# Patient Record
Sex: FEMALE
[De-identification: names, ages, dates, MRNs, and addresses within clinical notes are randomized per-mention and may not be internally consistent; named-entity substitution may affect disease eponyms.]

---

## 2020-08-31 DIAGNOSIS — A49.9 URINARY TRACT INFECTION, SITE NOT SPECIFIED: ICD-10-CM

## 2020-08-31 DIAGNOSIS — N39.0 URINARY TRACT INFECTION, SITE NOT SPECIFIED: ICD-10-CM

## 2020-08-31 PROBLEM — Z00.00 ENCOUNTER FOR PREVENTIVE HEALTH EXAMINATION: Status: ACTIVE | Noted: 2020-08-31

## 2020-09-02 ENCOUNTER — APPOINTMENT (OUTPATIENT)
Dept: UROGYNECOLOGY | Facility: CLINIC | Age: 60
End: 2020-09-02
Payer: COMMERCIAL

## 2020-09-02 VITALS
BODY MASS INDEX: 21.12 KG/M2 | SYSTOLIC BLOOD PRESSURE: 120 MMHG | TEMPERATURE: 94.7 F | DIASTOLIC BLOOD PRESSURE: 80 MMHG | HEIGHT: 66.5 IN | WEIGHT: 133 LBS

## 2020-09-02 DIAGNOSIS — N95.2 POSTMENOPAUSAL ATROPHIC VAGINITIS: ICD-10-CM

## 2020-09-02 DIAGNOSIS — N39.8 OTHER SPECIFIED DISORDERS OF URINARY SYSTEM: ICD-10-CM

## 2020-09-02 PROCEDURE — 51798 US URINE CAPACITY MEASURE: CPT

## 2020-09-02 PROCEDURE — 99204 OFFICE O/P NEW MOD 45 MIN: CPT

## 2020-09-02 RX ORDER — ESTRADIOL 0.1 MG/G
0.1 CREAM VAGINAL
Qty: 1 | Refills: 3 | Status: ACTIVE | COMMUNITY
Start: 2020-09-02 | End: 1900-01-01

## 2020-09-02 RX ORDER — CONJUGATED ESTROGENS 0.62 MG/G
0.62 CREAM VAGINAL
Qty: 3 | Refills: 3 | Status: ACTIVE | COMMUNITY
Start: 2020-09-02 | End: 1900-01-01

## 2020-09-02 NOTE — LETTER BODY
[Dear  ___] : Dear  [unfilled], [I had the pleasure of evaluating your patient, [unfilled]. Thank you for referring Ms. [unfilled] for consultation for ___] : I had the pleasure of evaluating your patient, [unfilled]. Thank you for referring Ms. [unfilled] for consultation for [unfilled]. [Attached please find my note.] : Attached please find my note. [Thank you very much for allowing me to participate in the care of this patient. If you have any questions, please do not hesitate to contact me] : Thank you very much for allowing me to participate in the care of this patient. If you have any questions, please do not hesitate to contact me. [DrBrittanie  ___] : Dr. GIVENS

## 2020-09-02 NOTE — PHYSICAL EXAM
[No Acute Distress] : in no acute distress [Well developed] : well developed [Well Nourished] : ~L well nourished [Oriented x3] : oriented to person, place, and time [Good Hygeine] : demonstrates good hygeine [Normal Memory] : ~T memory was ~L unimpaired [Normal Mood/Affect] : mood and affect are normal [Normal Lung Sounds] : the lungs were clear to auscultation [Respirations regular] : ~T respiratory rate was regular [Rate & Rhythm Regular] : ~T heart rate and rhythm were normal [No Edema] : ~T edema was not present [Supple] : ~T the neck demonstrated no ~M decrease in suppleness [Thyroid Normal] : the thyroid ~T showed no abnormalities [Symmetrical] : the neck was ~L symmetrical [Normal Gait] : gait was normal [Labia Majora] : were normal [Labia Minora] : were normal [Bartholin's Gland] : both Bartholin's glands were normal  [Normal Appearance] : general appearance was normal [Atrophy] : atrophy [No Bleeding] : there was no active vaginal bleeding [Normal] : no abnormalities [Exam Deferred] : was deferred [1] : 1 [Post Void Residual ____ml] : post void residual was [unfilled] ml [Anxiety] : patient is not anxious [Dyspnea] : no dyspnea [Cough] : no cough [Murmurs] : no murmurs were heard [Varicose Veins] : no varicose veins observed [Tracheal Deviation] : no tracheal deviation observed [Mass (___ Cm)] : no ~M [unfilled] abdominal mass was palpated [Mass] : no ~M [unfilled] neck mass was observed [Tenderness] : ~T no ~M abdominal tenderness observed [Distended] : not distended [Hernia] : no hernia observed [H/Smegaly] : no hepatosplenomegaly [Scar] : no scars [Estrogen Effect] : no estrogen effect was observed [de-identified] : reproducible tenderness with a q tip [de-identified] : no sig prolapse

## 2020-09-02 NOTE — HISTORY OF PRESENT ILLNESS
[FreeTextEntry1] : The pt is a 61 y/o P1 with hx of  UTIs since 1983 annually then she began having constant urgency, freq since 7/2 although all her urine cxs have been neg.  \par Her discomfort improves after voiding.\par She was being treated by a urologist for yrs and underwent urethral dilation numerous times with some improvement.  She is not sure of having had cystoscopy.\par She also has tried Chinese herbal medications and tea with some improvement.  \par She denies UI\par She feels complete emptying of her bladder.\par She voids every hr with a medium volume and has nocturia 0-2 with a medium volume\par Her liquid intake consists of water, 1 cup of coffee/day.  She had a glass of white wine and is now feeling much worse.\par She has a BM q day .\par She denies gross hematuria, hx of nephrolithiaisis or vaginal bulge.\par Her last PAP was 2019 , and she denies a hx of abnormal PAP.\par The last time she had intercourse was 3 wks ago . She reports pain with touch\par She denies having had surgery.\par She is in retail, fashion\par

## 2020-09-02 NOTE — ASSESSMENT
[FreeTextEntry1] : Today's findings were discussed with the pt and written pamphlets were provided for IC and vaginal atrophy.   She will be scheduled for office cystoscopy.  Also, she will be scheduled for a repeat PVR and  her urine was sent for culture today to rule out an infectious cause of her symptoms.\par She was prescribed with estrogen cream for tx of vaginal atrophy\par \par \par

## 2020-09-04 LAB — BACTERIA UR CULT: NORMAL

## 2020-09-21 ENCOUNTER — APPOINTMENT (OUTPATIENT)
Dept: UROGYNECOLOGY | Facility: CLINIC | Age: 60
End: 2020-09-21
Payer: COMMERCIAL

## 2020-09-21 PROCEDURE — 51798 US URINE CAPACITY MEASURE: CPT

## 2020-09-21 PROCEDURE — 52000 CYSTOURETHROSCOPY: CPT

## 2020-09-23 LAB — URINE CYTOLOGY: NORMAL

## 2020-10-05 ENCOUNTER — APPOINTMENT (OUTPATIENT)
Dept: UROGYNECOLOGY | Facility: CLINIC | Age: 60
End: 2020-10-05
Payer: COMMERCIAL

## 2020-10-05 DIAGNOSIS — N30.10 INTERSTITIAL CYSTITIS (CHRONIC) W/OUT HEMATURIA: ICD-10-CM

## 2020-10-05 PROCEDURE — 99214 OFFICE O/P EST MOD 30 MIN: CPT

## 2020-10-05 NOTE — ASSESSMENT
[FreeTextEntry1] : Various tx options were dw the pt including bladder instillation, pelvic floor PT and medical tx.\par She would like to try bladder instillation 1st.\par She is considering pelvic floor PT\par \par \par

## 2020-10-05 NOTE — HISTORY OF PRESENT ILLNESS
[FreeTextEntry1] : The pt is here for a f/u visit for symptoms of IC.\par Urine cx: neg\par Cystoscopy: neg\par \par She notices that coffee triggers her pain.\par \par

## 2020-10-21 ENCOUNTER — APPOINTMENT (OUTPATIENT)
Dept: UROGYNECOLOGY | Facility: CLINIC | Age: 60
End: 2020-10-21
Payer: COMMERCIAL

## 2020-10-21 PROCEDURE — 51700 IRRIGATION OF BLADDER: CPT

## 2020-10-22 ENCOUNTER — TRANSCRIPTION ENCOUNTER (OUTPATIENT)
Age: 60
End: 2020-10-22

## 2020-10-22 ENCOUNTER — NON-APPOINTMENT (OUTPATIENT)
Age: 60
End: 2020-10-22

## 2020-10-24 LAB — BACTERIA UR CULT: NORMAL

## 2020-10-29 ENCOUNTER — APPOINTMENT (OUTPATIENT)
Dept: UROGYNECOLOGY | Facility: CLINIC | Age: 60
End: 2020-10-29
Payer: COMMERCIAL

## 2020-10-29 PROCEDURE — 99072 ADDL SUPL MATRL&STAF TM PHE: CPT

## 2020-10-29 PROCEDURE — 51700 IRRIGATION OF BLADDER: CPT

## 2020-11-04 ENCOUNTER — APPOINTMENT (OUTPATIENT)
Dept: UROGYNECOLOGY | Facility: CLINIC | Age: 60
End: 2020-11-04
Payer: COMMERCIAL

## 2020-11-04 PROCEDURE — 51700 IRRIGATION OF BLADDER: CPT

## 2020-11-04 PROCEDURE — 99072 ADDL SUPL MATRL&STAF TM PHE: CPT

## 2020-11-11 ENCOUNTER — APPOINTMENT (OUTPATIENT)
Dept: UROGYNECOLOGY | Facility: CLINIC | Age: 60
End: 2020-11-11
Payer: COMMERCIAL

## 2020-11-11 PROCEDURE — 51700 IRRIGATION OF BLADDER: CPT

## 2020-11-11 PROCEDURE — 99072 ADDL SUPL MATRL&STAF TM PHE: CPT

## 2020-11-11 PROCEDURE — 99213 OFFICE O/P EST LOW 20 MIN: CPT | Mod: 25

## 2020-11-18 ENCOUNTER — APPOINTMENT (OUTPATIENT)
Dept: UROGYNECOLOGY | Facility: CLINIC | Age: 60
End: 2020-11-18
Payer: COMMERCIAL

## 2020-11-18 PROCEDURE — 51700 IRRIGATION OF BLADDER: CPT

## 2020-12-02 ENCOUNTER — APPOINTMENT (OUTPATIENT)
Dept: UROGYNECOLOGY | Facility: CLINIC | Age: 60
End: 2020-12-02

## 2020-12-23 PROBLEM — N39.0 UTI (URINARY TRACT INFECTION), BACTERIAL: Status: RESOLVED | Noted: 2020-08-31 | Resolved: 2020-12-23

## 2021-11-08 ENCOUNTER — APPOINTMENT (OUTPATIENT)
Dept: UROLOGY | Facility: CLINIC | Age: 61
End: 2021-11-08
Payer: COMMERCIAL

## 2021-11-08 VITALS
TEMPERATURE: 97.6 F | HEART RATE: 67 BPM | DIASTOLIC BLOOD PRESSURE: 72 MMHG | SYSTOLIC BLOOD PRESSURE: 114 MMHG | OXYGEN SATURATION: 99 %

## 2021-11-08 LAB
BILIRUB UR QL STRIP: NORMAL
CLARITY UR: CLEAR
COLLECTION METHOD: NORMAL
GLUCOSE UR-MCNC: NORMAL
HCG UR QL: 0.2 EU/DL
HGB UR QL STRIP.AUTO: NORMAL
KETONES UR-MCNC: NORMAL
LEUKOCYTE ESTERASE UR QL STRIP: NORMAL
NITRITE UR QL STRIP: NORMAL
PH UR STRIP: 5.5
PROT UR STRIP-MCNC: NORMAL
SP GR UR STRIP: <=1.005

## 2021-11-08 PROCEDURE — 51798 US URINE CAPACITY MEASURE: CPT

## 2021-11-08 PROCEDURE — 99072 ADDL SUPL MATRL&STAF TM PHE: CPT

## 2021-11-08 PROCEDURE — 99204 OFFICE O/P NEW MOD 45 MIN: CPT

## 2021-11-08 PROCEDURE — 81003 URINALYSIS AUTO W/O SCOPE: CPT | Mod: QW

## 2021-11-08 RX ORDER — MIRABEGRON 25 MG/1
25 TABLET, FILM COATED, EXTENDED RELEASE ORAL
Qty: 30 | Refills: 1 | Status: ACTIVE | COMMUNITY
Start: 2021-11-08 | End: 1900-01-01

## 2021-11-08 NOTE — REVIEW OF SYSTEMS
[see HPI] : see HPI [Negative] : Heme/Lymph [Constipation] : constipation [Diarrhea] : diarrhea [Anxiety] : anxiety

## 2021-11-09 LAB — URINE CYTOLOGY: NORMAL

## 2021-11-09 NOTE — PHYSICAL EXAM

## 2021-11-09 NOTE — LETTER BODY
[Dear  ___] : Dear  [unfilled], [Consult Letter:] : I had the pleasure of evaluating your patient, [unfilled]. [Please see my note below.] : Please see my note below. [Consult Closing:] : Thank you very much for allowing me to participate in the care of this patient.  If you have any questions, please do not hesitate to contact me. [Sincerely,] : Sincerely, [FreeTextEntry3] : Dr. Ashleigh Guzmán

## 2021-11-09 NOTE — ADDENDUM
[FreeTextEntry1] : A portion of this note was written by [Dory Valladares] on 11/08/2021 acting as a scribe for Dr. Guzmán. \par \par I have personally reviewed the chart and agree that the record accurately reflects my personal performance of the history, physical exam, assessment, and plan.

## 2021-11-09 NOTE — HISTORY OF PRESENT ILLNESS
[FreeTextEntry1] : Pt is a 62 yo female  presenting today with presumed interstitial cystitis diagnosed last year and a long-standing hx of UTIs, referred by Dr. Martinez. She underwent 6 bladder instillations last year with no significant improvement  Pt states her urinary frequency first began in  which at the time was treated with several urethral dilations, (provided modest symptom improvement).  Pt now adheres to an IC diet which she believes helps her symptoms.  However, since August she has been increasingly bothered by her bladder.  \par Nocturia 0-1. \par \par Pt went to 1 session of pelvic floor PT last week but "didn’t feel like it did anything".   She uses premarin for vaginal dryness which helps. \par \par Sexually active.- no dyspareunia, no vaginal dryness\par \par Udip: negative\par PVR: 0 cc (to rule out incomplete bladder emptying)\par \par PMH: hypothyroidism, breast CA, anxiety \par PSH: partial thyroidectomy, lumpectomy \par FH: no  malignancies, no urolithiasis, HTN, DM \par SH:non-smoker, no alcohol, retail, \par \par Habits: on interstitial cystitis diet (no acidic foods, no caffeine)\par \par Allergies: NKDA\par Meds: estradiol, premarin, levothyroxine \par

## 2021-11-09 NOTE — ASSESSMENT
[FreeTextEntry1] : Pt is a 60 yo F with urinary frequency, previously diagnosed with interstitial cystitis. We preliminarily discussed medication options to manage urinary frequency, and the risks/benefits of each.  On exam she has evidence of pelvic floor dysfunction and I have suggested she try pelvic floor PT at our office, which she has agreed to do.  \par She will attempt bladder retraining and also start Mirabegron 25mg to help with this.  I renewed her Premarin and sent today's urine for culture. \par

## 2021-11-11 DIAGNOSIS — N32.81 OVERACTIVE BLADDER: ICD-10-CM

## 2021-11-11 RX ORDER — OXYBUTYNIN CHLORIDE 10 MG/1
10 TABLET, EXTENDED RELEASE ORAL
Qty: 30 | Refills: 1 | Status: ACTIVE | COMMUNITY
Start: 2021-11-11 | End: 1900-01-01

## 2021-11-18 LAB — BACTERIA UR CULT: ABNORMAL

## 2021-11-18 RX ORDER — NITROFURANTOIN (MONOHYDRATE/MACROCRYSTALS) 25; 75 MG/1; MG/1
100 CAPSULE ORAL
Qty: 10 | Refills: 0 | Status: ACTIVE | COMMUNITY
Start: 2021-11-18 | End: 1900-01-01

## 2021-12-09 ENCOUNTER — APPOINTMENT (OUTPATIENT)
Dept: UROLOGY | Facility: CLINIC | Age: 61
End: 2021-12-09
Payer: COMMERCIAL

## 2021-12-09 VITALS
SYSTOLIC BLOOD PRESSURE: 137 MMHG | TEMPERATURE: 97.4 F | OXYGEN SATURATION: 98 % | HEART RATE: 72 BPM | DIASTOLIC BLOOD PRESSURE: 82 MMHG

## 2021-12-09 LAB
BILIRUB UR QL STRIP: NORMAL
CLARITY UR: CLEAR
COLLECTION METHOD: NORMAL
GLUCOSE UR-MCNC: NORMAL
HCG UR QL: 0.2 EU/DL
HGB UR QL STRIP.AUTO: NORMAL
KETONES UR-MCNC: NORMAL
LEUKOCYTE ESTERASE UR QL STRIP: NORMAL
NITRITE UR QL STRIP: NORMAL
PH UR STRIP: 6.5
PROT UR STRIP-MCNC: NORMAL
SP GR UR STRIP: 1.01

## 2021-12-09 PROCEDURE — 51798 US URINE CAPACITY MEASURE: CPT

## 2021-12-09 PROCEDURE — 81003 URINALYSIS AUTO W/O SCOPE: CPT | Mod: QW

## 2021-12-09 PROCEDURE — 99214 OFFICE O/P EST MOD 30 MIN: CPT

## 2021-12-09 NOTE — PHYSICAL EXAM

## 2021-12-09 NOTE — HISTORY OF PRESENT ILLNESS
[FreeTextEntry1] : Pt is a 60 yo female  with presumed interstitial cystitis diagnosed last year and a long-standing hx of UTIs, referred by Dr. Martinez. She returns today with slight dysuria and a sense of urinary urgency since Saturday after sexual intercourse. She was recently treated with Macrobid for a positive urine culture 21 and finished abx on 21. She continues to use premarin every other day and has started PT. \par UCx: 21-- 50,000-99,000 cfu/mL E. coli\par \par Udip: (+) small inocencio.\par PVR: 52 cc (to rule out incomplete bladder emptying) \par \par Full hx: She underwent 6 bladder instillations last year with no significant improvement  Pt states her urinary frequency first began in  which at the time was treated with several urethral dilations, (provided modest symptom improvement).  Pt now adheres to an IC diet which she believes helps her symptoms.  However, since August she has been increasingly bothered by her bladder.  \par Nocturia 0-1. \par \par Pt went to 1 session of pelvic floor PT last week but "didn’t feel like it did anything".   She uses premarin for vaginal dryness which helps. \par \par Sexually active.- no dyspareunia, no vaginal dryness\par \par \par PMH: hypothyroidism, breast CA, anxiety \par PSH: partial thyroidectomy, lumpectomy \par FH: no  malignancies, no urolithiasis, HTN, DM \par SH:non-smoker, no alcohol, retail, \par \par Habits: on interstitial cystitis diet (no acidic foods, no caffeine)\par \par Allergies: NKDA\par Meds: estradiol, premarin, levothyroxine \par

## 2021-12-09 NOTE — ADDENDUM
[FreeTextEntry1] : A portion of this note was written by [Dory Valladares] on 12/09/2021 acting as a scribe for Dr. Guzmán. \par \par I have personally reviewed the chart and agree that the record accurately reflects my personal performance of the history, physical exam, assessment, and plan.\par

## 2021-12-09 NOTE — ASSESSMENT
[FreeTextEntry1] : Pt is a 60 yo F with urinary frequency, previously diagnosed with interstitial cystitis. Today's urine was sent for culture. We will hold off on treatment until we received culture results. For now, I suggested she drink plenty of water and begin taking Myrbetriq. \par \par

## 2021-12-09 NOTE — REVIEW OF SYSTEMS
[Constipation] : constipation [Diarrhea] : diarrhea [see HPI] : see HPI [Anxiety] : anxiety [Negative] : Heme/Lymph

## 2021-12-13 LAB — BACTERIA UR CULT: NORMAL

## 2021-12-15 ENCOUNTER — NON-APPOINTMENT (OUTPATIENT)
Age: 61
End: 2021-12-15

## 2022-03-07 DIAGNOSIS — N39.0 URINARY TRACT INFECTION, SITE NOT SPECIFIED: ICD-10-CM

## 2022-05-14 ENCOUNTER — NON-APPOINTMENT (OUTPATIENT)
Age: 62
End: 2022-05-14

## 2022-05-19 RX ORDER — CONJUGATED ESTROGENS 0.62 MG/G
0.62 CREAM VAGINAL
Qty: 1 | Refills: 11 | Status: ACTIVE | COMMUNITY
Start: 2021-11-08 | End: 1900-01-01

## 2022-06-20 ENCOUNTER — NON-APPOINTMENT (OUTPATIENT)
Age: 62
End: 2022-06-20

## 2022-06-24 LAB — BACTERIA UR CULT: NORMAL

## 2022-06-27 ENCOUNTER — NON-APPOINTMENT (OUTPATIENT)
Age: 62
End: 2022-06-27

## 2024-03-10 PROBLEM — N30.10 INTERSTITIAL CYSTITIS: Status: ACTIVE | Noted: 2020-09-02
